# Patient Record
Sex: MALE | Race: WHITE
[De-identification: names, ages, dates, MRNs, and addresses within clinical notes are randomized per-mention and may not be internally consistent; named-entity substitution may affect disease eponyms.]

---

## 2018-01-18 ENCOUNTER — HOSPITAL ENCOUNTER (OUTPATIENT)
Dept: HOSPITAL 7 - FB.SDS | Age: 73
Discharge: HOME | End: 2018-01-18
Attending: SURGERY
Payer: MEDICARE

## 2018-01-18 DIAGNOSIS — Z79.01: ICD-10-CM

## 2018-01-18 DIAGNOSIS — N40.0: ICD-10-CM

## 2018-01-18 DIAGNOSIS — Z12.11: Primary | ICD-10-CM

## 2018-01-18 DIAGNOSIS — K57.30: ICD-10-CM

## 2018-01-18 DIAGNOSIS — I48.1: ICD-10-CM

## 2018-01-18 DIAGNOSIS — Z79.899: ICD-10-CM

## 2018-01-18 DIAGNOSIS — I10: ICD-10-CM

## 2018-01-18 PROCEDURE — 00812 ANES LWR INTST SCR COLSC: CPT

## 2018-01-18 NOTE — PCM.OPNOTE
- General Post-Op/Procedure Note


Date of Surgery/Procedure: 01/18/18


Operative Procedure(s): c scope


Findings: 





descending and sigmoid diverticulosis





Pre Op Diagnosis: screening


Post-Op Diagnosis: descending and sigmoid diverticulosis


Anesthesia Technique: MAC


Primary Surgeon: Hermes Hurtado


Anesthesia Provider: Bernice Self


Pathology: 





none





Complications: None


Condition: Good


Free Text/Narrative:: 





see dictation

## 2018-01-18 NOTE — OR
DATE OF OPERATION:  01/18/2018

 

SURGEON:  Hermes Hurtado MD

 

PROCEDURE PERFORMED:  Colonoscopy.

 

PREOPERATIVE DIAGNOSIS:  Due for colon cancer screening.

 

POSTOPERATIVE DIAGNOSIS:  Diverticulosis of the descending and sigmoid colons.

 

INDICATIONS FOR PROCEDURE:  This is a 72-year-old white male who presents for

his followup colonoscopy.  He was offered and accepted same.

 

DESCRIPTION OF OPERATION:  After an excellent IV sedation was administered,

digital rectal exam was performed.  No marked abnormality was noted.  Flexible

colonoscope was inserted and advanced to the cecum without difficulty.  The prep

was excellent.  The following findings were noted.  Ascending colon,

unremarkable.  Transverse colon, unremarkable.  Descending colon, scattered

diverticula.  Sigmoid colon, scattered diverticula.  Rectum and anus were

unremarkable.  Colon was deflated.  The scope was removed.  The patient

tolerated the procedure.  He was taken to Recovery in a good condition.  Repeat

colonoscopy in 10 years.

 

Job#: [id number]

DD: 01/18/2018 0818

DT: 01/18/2018 0833 AS/MODL

## 2021-12-14 NOTE — OR
DATE OF OPERATION:  12/14/2021

 

SURGEON:  Yvonne Ward MD

 

PREOPERATIVE DIAGNOSIS:  Visually significant cataract, left eye.

 

POSTOPERATIVE DIAGNOSIS:  Visually significant cataract, left eye.

 

PROCEDURES PERFORMED:  Phacoemulsification with intraocular lens placement, left

eye.

 

ASSISTANTS:  None.

 

ANESTHESIA:  Local with sedation.

 

COMPLICATIONS:  None.

 

BLOOD LOSS:  None.

 

IMPLANTS:  A pre-loaded DCB00 21.0 diopter lens implanted.

 

CDE:  3.11.

 

DESCRIPTION OF PROCEDURE:  After risks and benefits were reviewed with the

patient, consent was obtained in the preoperative area, and the operative eye

was marked with a surgical pen.  In the preoperative area, a pledget was used to

dilate the pupil consisting of a mixture of phenylephrine 10%, cyclopentolate

2%, moxifloxacin 0.5%, and bupivacaine 0.75%.  The patient was taken to the

operating room, where a time-out was performed, and the patient was placed under

monitored anesthesia care.  Topical tetracaine was used for anesthesia.

 

The operative eye was prepped and draped for ophthalmic surgery, and the

microscope was brought into position and focused.  A paracentesis incision was

made, followed by injection of preservative-free 1% lidocaine into the anterior

chamber, followed by injection of Viscoat into the anterior chamber.  A

microkeratome blade was used to make a corneal limbal incision temporally.  A

cystotome was used to make the beginning of the capsulorrhexis, which was

carried around 360 degrees in a curvilinear fashion using Utrata forceps.  A

Bryant cannula with BSS was used to hydrodissect and hydrodelineate the nucleus.

The nucleus was removed in a divide and conquer manner using

phacoemulsification.  Irrigation and aspiration were used to remove the

remaining cortical material.  Provisc was used to inflate the capsular bag, and

a pre-loaded DCB00 21.0 diopter lens, serial number [id number] was injected into

the capsular bag.  A Sinskey hook was used to position and center the lens.

 

Next, irrigation and aspiration was used to remove any remaining viscoelastic

and cortical material from the anterior chamber.  BSS on a cannula was used to

inflate the anterior chamber and hydrate the wound.  The wound was checked and

found to be watertight.  1 mg of Moxifloxacin was injected into the anterior

chamber.  Drapes were removed and the eye was cleaned.  A drop of brimonidine

0.2% and a drop of TobraDex was placed.  The eye was shielded, and the patient

was taken to the recovery room in stable condition.

 

Job#: [id number]

DD: 12/14/2021 0857

DT: 12/14/2021 0919 AK/ARGELIA

## 2022-01-11 NOTE — OR
DATE OF OPERATION:  01/11/2022

 

SURGEON:  Yvonne Ward MD

 

PREOPERATIVE DIAGNOSIS:  Visually significant cataract, right eye.

 

POSTOPERATIVE DIAGNOSIS:  Visually significant cataract, right eye.

 

PROCEDURES PERFORMED:  Phacoemulsification with intraocular lens placement,

right eye.

 

ASSISTANTS:  None.

 

ANESTHESIA:  Local with sedation.

 

COMPLICATIONS:  None.

 

BLOOD LOSS:  None.

 

IMPLANTS:  A pre-loaded DCB00 21.0 diopter lens implanted.

 

CDE:  3.48.

 

DESCRIPTION OF PROCEDURE:  After risks and benefits were reviewed with the

patient, consent was obtained in the preoperative area, and the operative eye

was marked with a surgical pen.  In the preoperative area, a pledget was used to

dilate the pupil consisting of a mixture of phenylephrine 10%, cyclopentolate

2%, moxifloxacin 0.5%, and bupivacaine 0.75%.  The patient was taken to the

operating room, where a time-out was performed, and the patient was placed under

monitored anesthesia care.  Topical tetracaine was used for anesthesia.

 

The operative eye was prepped and draped for ophthalmic surgery, and the

microscope was brought into position and focused.  A paracentesis incision was

made, followed by injection of preservative-free 1% lidocaine into the anterior

chamber, followed by injection of Viscoat into the anterior chamber.  A

microkeratome blade was used to make a corneal limbal incision temporally.  A

cystotome was used to make the beginning of the capsulorrhexis, which was

carried around 360 degrees in a curvilinear fashion using Utrata forceps.  A

Bryant cannula with BSS was used to hydrodissect and hydrodelineate the nucleus.

The nucleus was removed in a divide and conquer manner using

phacoemulsification.  Irrigation and aspiration were used to remove the

remaining cortical material.  Provisc was used to inflate the capsular bag, and

a pre-loaded DCB00 21.0 diopter lens, serial number [id number] was injected into

the capsular bag.  A Sinskey hook was used to position and center the lens.

 

Next, irrigation and aspiration was used to remove any remaining viscoelastic

and cortical material from the anterior chamber.  BSS on a cannula was used to

inflate the anterior chamber and hydrate the wound.  The wound was checked and

found to be watertight.  1 mg of Moxifloxacin was injected into the anterior

chamber.  Drapes were removed and the eye was cleaned.  A drop of brimonidine

0.2% and a drop of TobraDex was placed.  The eye was shielded, and the patient

was taken to the recovery room in stable condition.

 

Job#: [id number]

DD: 01/11/2022 0922

DT: 01/11/2022 0956 AK/ARGELIA